# Patient Record
Sex: FEMALE | Race: WHITE | NOT HISPANIC OR LATINO | URBAN - METROPOLITAN AREA
[De-identification: names, ages, dates, MRNs, and addresses within clinical notes are randomized per-mention and may not be internally consistent; named-entity substitution may affect disease eponyms.]

---

## 2020-12-10 ENCOUNTER — APPOINTMENT (RX ONLY)
Dept: URBAN - METROPOLITAN AREA CLINIC 94 | Facility: CLINIC | Age: 45
Setting detail: DERMATOLOGY
End: 2020-12-10

## 2020-12-10 VITALS — TEMPERATURE: 97.9 F

## 2020-12-10 DIAGNOSIS — L71.8 OTHER ROSACEA: ICD-10-CM

## 2020-12-10 PROCEDURE — ? TREATMENT REGIMEN

## 2020-12-10 PROCEDURE — ? PRESCRIPTION

## 2020-12-10 PROCEDURE — 99202 OFFICE O/P NEW SF 15 MIN: CPT

## 2020-12-10 PROCEDURE — ? COUNSELING

## 2020-12-10 RX ORDER — DOXYCYCLINE HYCLATE 20 MG/1
TABLET, FILM COATED ORAL TWICE A DAY
Qty: 60 | Refills: 2 | Status: ERX | COMMUNITY
Start: 2020-12-10

## 2020-12-10 RX ADMIN — DOXYCYCLINE HYCLATE: 20 TABLET, FILM COATED ORAL at 00:00

## 2020-12-10 ASSESSMENT — LOCATION SIMPLE DESCRIPTION DERM: LOCATION SIMPLE: LEFT CHEEK

## 2020-12-10 ASSESSMENT — LOCATION DETAILED DESCRIPTION DERM: LOCATION DETAILED: LEFT INFERIOR CENTRAL MALAR CHEEK

## 2020-12-10 ASSESSMENT — LOCATION ZONE DERM: LOCATION ZONE: FACE

## 2020-12-10 ASSESSMENT — SEVERITY ASSESSMENT OVERALL AMONG ALL PATIENTS
IN YOUR EXPERIENCE, AMONG ALL PATIENTS YOU HAVE SEEN WITH THIS CONDITION, HOW SEVERE IS THIS PATIENT'S CONDITION?: MODERATE

## 2020-12-10 NOTE — HPI: RASH (ROSACEA)
How Severe Is Your Rosacea?: moderate
Is This A New Presentation, Or A Follow-Up?: Rosacea
Additional History: \\nPatient is afebrile at presentation. The patient denies any symptoms consistent with Covid-19. The patient has no recent travel and no sick contacts.

## 2020-12-10 NOTE — PROCEDURE: TREATMENT REGIMEN
Detail Level: Detailed
Plan: Also discussed rosacea triple gel. Patient to start trial of doxycycline 20mg BID. I discussed that this may not be effective as patient does not have significant papulopustular component (patient would like to try anyway)
Samples Given: Samples of Rhofade given to try. Apply topically to the affected area of the face, it lasts 12 hours.

## 2021-03-10 ENCOUNTER — APPOINTMENT (RX ONLY)
Dept: URBAN - METROPOLITAN AREA CLINIC 94 | Facility: CLINIC | Age: 46
Setting detail: DERMATOLOGY
End: 2021-03-10

## 2021-03-10 VITALS — TEMPERATURE: 97.4 F

## 2021-03-10 DIAGNOSIS — L71.8 OTHER ROSACEA: ICD-10-CM | Status: INADEQUATELY CONTROLLED

## 2021-03-10 PROCEDURE — ? TREATMENT REGIMEN

## 2021-03-10 PROCEDURE — ? PRESCRIPTION

## 2021-03-10 PROCEDURE — 99213 OFFICE O/P EST LOW 20 MIN: CPT

## 2021-03-10 PROCEDURE — ? COUNSELING

## 2021-03-10 ASSESSMENT — LOCATION ZONE DERM: LOCATION ZONE: FACE

## 2021-03-10 ASSESSMENT — LOCATION SIMPLE DESCRIPTION DERM
LOCATION SIMPLE: LEFT CHEEK
LOCATION SIMPLE: INFERIOR FOREHEAD
LOCATION SIMPLE: RIGHT CHEEK

## 2021-03-10 ASSESSMENT — LOCATION DETAILED DESCRIPTION DERM
LOCATION DETAILED: LEFT CENTRAL MALAR CHEEK
LOCATION DETAILED: INFERIOR MID FOREHEAD
LOCATION DETAILED: RIGHT INFERIOR CENTRAL MALAR CHEEK

## 2021-03-10 NOTE — PROCEDURE: TREATMENT REGIMEN
Initiate Treatment: Patient will try the [place] Rosacea gel\\n[employer] Milk \\n[employer] Antirouge[employer] Relief twice daily
Detail Level: Detailed
Continue Regimen: Rhofade to use on face for flushing \\nWill make consultation with the 
Discontinue Regimen: Patient will discontinue the Doxycycline